# Patient Record
Sex: FEMALE | Race: OTHER | HISPANIC OR LATINO
[De-identification: names, ages, dates, MRNs, and addresses within clinical notes are randomized per-mention and may not be internally consistent; named-entity substitution may affect disease eponyms.]

---

## 2020-03-12 ENCOUNTER — TRANSCRIPTION ENCOUNTER (OUTPATIENT)
Age: 20
End: 2020-03-12

## 2020-03-27 ENCOUNTER — APPOINTMENT (OUTPATIENT)
Dept: INTERNAL MEDICINE | Facility: CLINIC | Age: 20
End: 2020-03-27
Payer: COMMERCIAL

## 2020-03-27 VITALS
OXYGEN SATURATION: 99 % | SYSTOLIC BLOOD PRESSURE: 92 MMHG | HEART RATE: 52 BPM | DIASTOLIC BLOOD PRESSURE: 60 MMHG | WEIGHT: 138 LBS | HEIGHT: 63 IN | BODY MASS INDEX: 24.45 KG/M2

## 2020-03-27 VITALS — TEMPERATURE: 98.4 F

## 2020-03-27 DIAGNOSIS — Z78.9 OTHER SPECIFIED HEALTH STATUS: ICD-10-CM

## 2020-03-27 PROCEDURE — 99203 OFFICE O/P NEW LOW 30 MIN: CPT

## 2020-04-26 ENCOUNTER — MESSAGE (OUTPATIENT)
Age: 20
End: 2020-04-26

## 2020-05-04 LAB
SARS-COV-2 IGG SERPL IA-ACNC: <0.1 INDEX
SARS-COV-2 IGG SERPL QL IA: NEGATIVE

## 2020-05-18 ENCOUNTER — APPOINTMENT (OUTPATIENT)
Dept: INTERNAL MEDICINE | Facility: CLINIC | Age: 20
End: 2020-05-18
Payer: COMMERCIAL

## 2020-05-18 ENCOUNTER — TRANSCRIPTION ENCOUNTER (OUTPATIENT)
Age: 20
End: 2020-05-18

## 2020-05-18 VITALS
HEART RATE: 56 BPM | BODY MASS INDEX: 23.57 KG/M2 | SYSTOLIC BLOOD PRESSURE: 118 MMHG | TEMPERATURE: 98.8 F | DIASTOLIC BLOOD PRESSURE: 80 MMHG | WEIGHT: 133 LBS | HEIGHT: 63 IN

## 2020-05-18 DIAGNOSIS — Z76.89 PERSONS ENCOUNTERING HEALTH SERVICES IN OTHER SPECIFIED CIRCUMSTANCES: ICD-10-CM

## 2020-05-18 PROCEDURE — 99395 PREV VISIT EST AGE 18-39: CPT | Mod: 25

## 2020-05-18 PROCEDURE — 36415 COLL VENOUS BLD VENIPUNCTURE: CPT

## 2020-05-18 NOTE — HEALTH RISK ASSESSMENT
[Very Good] : ~his/her~  mood as very good [No] : No [0] : 2) Feeling down, depressed, or hopeless: Not at all (0) [HIV test declined] : HIV test declined [With Family] : lives with family [Employed] : employed [High School] : high school [Single] : single [Sexually Active] : sexually active [Feels Safe at Home] : Feels safe at home [With Patient/Caregiver] : With Patient/Caregiver [Designated Healthcare Proxy] : Designated healthcare proxy [Name: ___] : Health Care Proxy's Name: [unfilled]  [Relationship: ___] : Relationship: [unfilled] [] : No [[id number]] : 0 [AdvancecareDate] : 05/18/20

## 2020-05-18 NOTE — PHYSICAL EXAM
[Normal] : normal gait, coordination grossly intact, no focal deficits [de-identified] : dense breasts [de-identified] : no rashes

## 2020-05-19 LAB
25(OH)D3 SERPL-MCNC: 27.9 NG/ML
ALBUMIN SERPL ELPH-MCNC: 5 G/DL
ALP BLD-CCNC: 81 U/L
ALT SERPL-CCNC: 16 U/L
ANION GAP SERPL CALC-SCNC: 13 MMOL/L
AST SERPL-CCNC: 16 U/L
BASOPHILS # BLD AUTO: 0.02 K/UL
BASOPHILS NFR BLD AUTO: 0.3 %
BILIRUB SERPL-MCNC: 0.2 MG/DL
BUN SERPL-MCNC: 9 MG/DL
CALCIUM SERPL-MCNC: 9.2 MG/DL
CHLORIDE SERPL-SCNC: 103 MMOL/L
CHOLEST SERPL-MCNC: 150 MG/DL
CHOLEST/HDLC SERPL: 2.5 RATIO
CO2 SERPL-SCNC: 25 MMOL/L
CREAT SERPL-MCNC: 0.63 MG/DL
EOSINOPHIL # BLD AUTO: 0.04 K/UL
EOSINOPHIL NFR BLD AUTO: 0.6 %
GLUCOSE SERPL-MCNC: 87 MG/DL
HBV SURFACE AB SER QL: ABNORMAL
HCT VFR BLD CALC: 41.9 %
HDLC SERPL-MCNC: 60 MG/DL
HGB BLD-MCNC: 13.4 G/DL
IMM GRANULOCYTES NFR BLD AUTO: 0.3 %
LDLC SERPL CALC-MCNC: 63 MG/DL
LYMPHOCYTES # BLD AUTO: 1.69 K/UL
LYMPHOCYTES NFR BLD AUTO: 27.4 %
MAN DIFF?: NORMAL
MCHC RBC-ENTMCNC: 28.6 PG
MCHC RBC-ENTMCNC: 32 GM/DL
MCV RBC AUTO: 89.5 FL
MONOCYTES # BLD AUTO: 0.36 K/UL
MONOCYTES NFR BLD AUTO: 5.8 %
NEUTROPHILS # BLD AUTO: 4.03 K/UL
NEUTROPHILS NFR BLD AUTO: 65.6 %
PLATELET # BLD AUTO: 266 K/UL
POTASSIUM SERPL-SCNC: 3.7 MMOL/L
PROT SERPL-MCNC: 7.3 G/DL
RBC # BLD: 4.68 M/UL
RBC # FLD: 13 %
SODIUM SERPL-SCNC: 141 MMOL/L
T4 FREE SERPL-MCNC: 1.2 NG/DL
TRIGL SERPL-MCNC: 139 MG/DL
TSH SERPL-ACNC: 1.38 UIU/ML
WBC # FLD AUTO: 6.16 K/UL

## 2020-06-03 ENCOUNTER — APPOINTMENT (OUTPATIENT)
Dept: OBGYN | Facility: CLINIC | Age: 20
End: 2020-06-03

## 2020-07-08 ENCOUNTER — APPOINTMENT (OUTPATIENT)
Dept: OBGYN | Facility: CLINIC | Age: 20
End: 2020-07-08
Payer: COMMERCIAL

## 2020-07-08 VITALS
DIASTOLIC BLOOD PRESSURE: 62 MMHG | WEIGHT: 135 LBS | HEIGHT: 63 IN | SYSTOLIC BLOOD PRESSURE: 102 MMHG | BODY MASS INDEX: 23.92 KG/M2 | TEMPERATURE: 97.8 F

## 2020-07-08 PROCEDURE — 99214 OFFICE O/P EST MOD 30 MIN: CPT

## 2020-07-08 PROCEDURE — 36415 COLL VENOUS BLD VENIPUNCTURE: CPT

## 2020-07-09 ENCOUNTER — TRANSCRIPTION ENCOUNTER (OUTPATIENT)
Age: 20
End: 2020-07-09

## 2020-07-09 LAB
BASOPHILS # BLD AUTO: 0.03 K/UL
BASOPHILS NFR BLD AUTO: 0.4 %
EOSINOPHIL # BLD AUTO: 0.03 K/UL
EOSINOPHIL NFR BLD AUTO: 0.4 %
HCT VFR BLD CALC: 38.9 %
HGB BLD-MCNC: 12.5 G/DL
IMM GRANULOCYTES NFR BLD AUTO: 0.4 %
LYMPHOCYTES # BLD AUTO: 1.74 K/UL
LYMPHOCYTES NFR BLD AUTO: 22 %
MAN DIFF?: NORMAL
MCHC RBC-ENTMCNC: 29.1 PG
MCHC RBC-ENTMCNC: 32.1 GM/DL
MCV RBC AUTO: 90.5 FL
MONOCYTES # BLD AUTO: 0.48 K/UL
MONOCYTES NFR BLD AUTO: 6.1 %
NEUTROPHILS # BLD AUTO: 5.61 K/UL
NEUTROPHILS NFR BLD AUTO: 70.7 %
PLATELET # BLD AUTO: 232 K/UL
PROLACTIN SERPL-MCNC: 17.3 NG/ML
RBC # BLD: 4.3 M/UL
RBC # FLD: 13.2 %
TSH SERPL-ACNC: 1.28 UIU/ML
WBC # FLD AUTO: 7.92 K/UL

## 2020-07-09 NOTE — PHYSICAL EXAM
[Awake] : awake [Alert] : alert [Acute Distress] : no acute distress [LAD] : no lymphadenopathy [Thyroid Nodule] : no thyroid nodule [Goiter] : no goiter [Soft] : soft [Tender] : non tender [Distended] : not distended [H/Smegaly] : no hepatosplenomegaly [Oriented x3] : oriented to person, place, and time [Depressed Mood] : not depressed [Flat Affect] : affect not flat [RRR, No Murmurs] : RRR, no murmurs [CTAB] : CTAB

## 2020-07-09 NOTE — DISCUSSION/SUMMARY
[FreeTextEntry1] : Irregular menses - new onset over past 2 years\par Acne worsening over past year\par \par Reviewed possible causes and w/up\par Also desires more reliable BCM given on spironolactone\par reviewed types including combined oral contraceptive pills, IUD\par decided on combined oral contraceptive pills - reviewed pros/cons and risks including blood clot risk to extremities, lung and stroke (more remote)\par \par plan is for labwork\par PUS\par \par Will call with results when all have come in\par Answered questions\par \par Orin Perez MD, PhD\par

## 2020-07-09 NOTE — REVIEW OF SYSTEMS
[Chills] : no chills [Feeling Tired] : not feeling tired [Chest Pain] : no chest pain [Palpitations] : no palpitations [Dyspnea] : no shortness of breath [Cough] : no cough [SOB on Exertion] : no shortness of breath during exertion [Abdominal Pain] : no abdominal pain [Constipation] : no constipation [Diarrhea] : no diarrhea [Dysuria] : no dysuria [Pelvic Pain] : no pelvic pain [Abn Vag Bleeding] : abnormal vaginal bleeding [Urethral Discharge] : no abnormal urethral discharge [Arthralgias] : no arthralgias [Joint Pain] : no joint pain [Skin Lesions] : no skin lesions [Breast Pain] : no breast pain [Breast Lump] : no breast lump [Dizziness] : no dizziness [Fainting] : no fainting [Headache] : no headache [Sleep Disturbances] : no sleep disturbances [Anxiety] : no anxiety [Depression] : no depression [Hot Flashes] : no hot flashes [Deepening Voice] : no deepening of the voice [Feeling Weak] : no feelings of weakness [Nl] : Musculoskeletal

## 2020-07-10 ENCOUNTER — ASOB RESULT (OUTPATIENT)
Age: 20
End: 2020-07-10

## 2020-07-10 ENCOUNTER — APPOINTMENT (OUTPATIENT)
Dept: OBGYN | Facility: CLINIC | Age: 20
End: 2020-07-10
Payer: COMMERCIAL

## 2020-07-10 PROCEDURE — 76830 TRANSVAGINAL US NON-OB: CPT

## 2020-07-14 LAB
17OHP SERPL-MCNC: 37 NG/DL
TESTOST BND SERPL-MCNC: 0.9 PG/ML
TESTOST SERPL-MCNC: 23.5 NG/DL

## 2020-10-14 ENCOUNTER — APPOINTMENT (OUTPATIENT)
Dept: OBGYN | Facility: CLINIC | Age: 20
End: 2020-10-14
Payer: COMMERCIAL

## 2020-10-14 VITALS
BODY MASS INDEX: 23.74 KG/M2 | HEIGHT: 63 IN | SYSTOLIC BLOOD PRESSURE: 106 MMHG | TEMPERATURE: 97.8 F | WEIGHT: 134 LBS | DIASTOLIC BLOOD PRESSURE: 62 MMHG

## 2020-10-14 DIAGNOSIS — Z84.2 FAMILY HISTORY OF OTHER DISEASES OF THE GENITOURINARY SYSTEM: ICD-10-CM

## 2020-10-14 PROCEDURE — 99213 OFFICE O/P EST LOW 20 MIN: CPT

## 2020-10-14 RX ORDER — SPIRONOLACTONE 50 MG/1
50 TABLET ORAL
Refills: 0 | Status: DISCONTINUED | COMMUNITY
End: 2020-10-14

## 2020-10-15 ENCOUNTER — TRANSCRIPTION ENCOUNTER (OUTPATIENT)
Age: 20
End: 2020-10-15

## 2020-10-15 PROBLEM — Z84.2 FAMILY HISTORY OF OVARIAN CYST: Status: ACTIVE | Noted: 2020-10-15

## 2020-10-15 LAB
C TRACH RRNA SPEC QL NAA+PROBE: NOT DETECTED
N GONORRHOEA RRNA SPEC QL NAA+PROBE: NOT DETECTED
SOURCE AMPLIFICATION: NORMAL

## 2020-10-15 NOTE — HISTORY OF PRESENT ILLNESS
[FreeTextEntry1] : Ms Marquez is a healthy 18 yo G0 who presents for f/up of\par Irregular menses, acne and likely PCOS\par \par no changes in interim health\par \par Since last visit  - stopped spironolactone and started doxycycline by dermatologist\par some slow improvement with acne overall\par \par Started on trisprintec at our last visit 3 mo ago\par No unscheduled bleeding\par less cramping\par no side effects

## 2020-10-15 NOTE — PHYSICAL EXAM
[Appropriately responsive] : appropriately responsive [Alert] : alert [No Acute Distress] : no acute distress [No Lymphadenopathy] : no lymphadenopathy [Oriented x3] : oriented x3 [FreeTextEntry2] : skin - some improvement in acne on face - less at chin-line

## 2020-10-15 NOTE — REVIEW OF SYSTEMS
[Fever] : no fever [Fatigue] : no fatigue [Night Sweats] : no night sweats [Dry Eyes] : no dry eyes [Dyspnea] : no dyspnea [Chest Pain] : no chest pain [Cough] : no cough [Palpitations] : no palpitations [Abdominal Pain] : no abdominal pain [Constipation] : no constipation [Urgency] : no urgency [Frequency] : no frequency [Incontinence] : no incontinence [Dysuria] : no dysuria [Urethral Discharge] : no urethral discharge [Abn Vaginal bleeding] : no abnormal vaginal bleeding [Pelvic pain] : no pelvic pain [CVA Pain] : no CVA pain [Genital Rash/Irritation] : no genital rash/irritation [Breast Pain] : no breast pain [Breast Lump] : no breast lump [Nipple Changes] : no nipple changes [Skin Lesion on Breast] : no skin lesion on breast [Arthralgias] : no arthralgias [Myalgias] : no myalgias [Joint Stiffness] : no joint stiffness [Joint Swelling] : no joint swelling [Back Pain] : no back pain [Headache] : no headache [Dizziness] : no dizziness [Fainting] : no fainting [Anxiety] : no anxiety [Convulsions] : no convulsions [Depression] : no depression [Sleep Disturbances] : no sleep disturbances [Decreased Libido] : libido not decreased [PMS/PMDD Symptoms] : no PMS/PMDD symptoms [Feeling Weak] : not feeling weak [Deepening Voice] : no deepening voice [Hot Flashes] : no hot flashes [Easy Bleeding] : no easy bleeding [Easy Bruising] : no easy bruising [Swollen Glands] : no swollen glands [Nose Bleeds] : no nose bleeds [Negative] : Heme/Lymph [FreeTextEntry8] : post-coital spotting [de-identified] : slow improvement with acne

## 2020-10-15 NOTE — PLAN
[FreeTextEntry1] : PCOS\par will continue on tri-phasic pill\par bleeding regulated and decreased cramps no side effects\par reviewed strategies for skin\par \par new onset postcoital bleeding \par will check gc/chl\par steady partner - not new relationship\par \par answered questions\par \par f/up annual or sooner if issues\par \par Orin Perez MD, PhD\par

## 2020-10-16 ENCOUNTER — TRANSCRIPTION ENCOUNTER (OUTPATIENT)
Age: 20
End: 2020-10-16

## 2021-04-14 ENCOUNTER — APPOINTMENT (OUTPATIENT)
Dept: OBGYN | Facility: CLINIC | Age: 21
End: 2021-04-14
Payer: COMMERCIAL

## 2021-04-14 VITALS
SYSTOLIC BLOOD PRESSURE: 106 MMHG | HEIGHT: 63 IN | BODY MASS INDEX: 24.45 KG/M2 | DIASTOLIC BLOOD PRESSURE: 70 MMHG | WEIGHT: 138 LBS

## 2021-04-14 DIAGNOSIS — Z12.4 ENCOUNTER FOR SCREENING FOR MALIGNANT NEOPLASM OF CERVIX: ICD-10-CM

## 2021-04-14 PROCEDURE — 99395 PREV VISIT EST AGE 18-39: CPT

## 2021-04-14 PROCEDURE — 99072 ADDL SUPL MATRL&STAF TM PHE: CPT

## 2021-04-14 NOTE — REVIEW OF SYSTEMS
[Negative] : Heme/Lymph [Fever] : no fever [Fatigue] : no fatigue [Night Sweats] : no night sweats [Dry Eyes] : no dry eyes [Dyspnea] : no dyspnea [Cough] : no cough [Chest Pain] : no chest pain [Palpitations] : no palpitations [Abdominal Pain] : no abdominal pain [Constipation] : no constipation [Urgency] : no urgency [Frequency] : no frequency [Incontinence] : no incontinence [Dysuria] : no dysuria [Urethral Discharge] : no urethral discharge [Abn Vaginal bleeding] : no abnormal vaginal bleeding [Pelvic pain] : no pelvic pain [CVA Pain] : no CVA pain [Genital Rash/Irritation] : no genital rash/irritation [Breast Pain] : no breast pain [Breast Lump] : no breast lump [Nipple Changes] : no nipple changes [Skin Lesion on Breast] : no skin lesion on breast [Arthralgias] : no arthralgias [Myalgias] : no myalgias [Joint Stiffness] : no joint stiffness [Joint Swelling] : no joint swelling [Back Pain] : no back pain [Headache] : no headache [Dizziness] : no dizziness [Fainting] : no fainting [Convulsions] : no convulsions [Anxiety] : no anxiety [Depression] : no depression [Sleep Disturbances] : no sleep disturbances [Decreased Libido] : libido not decreased [PMS/PMDD Symptoms] : no PMS/PMDD symptoms [Deepening Voice] : no deepening voice [Feeling Weak] : not feeling weak [Hot Flashes] : no hot flashes [Easy Bleeding] : no easy bleeding [Easy Bruising] : no easy bruising [Swollen Glands] : no swollen glands [Nose Bleeds] : no nose bleeds [de-identified] : slow improvement with acne

## 2021-04-14 NOTE — HISTORY OF PRESENT ILLNESS
[Oral Contraceptive] : uses oral contraception pills [Y] : Patient is sexually active [Monogamous (Male Partner)] : is monogamous with a male partner [TextBox_4] : Aleksandra is a 19 yo G0 who presents for annual gyn exam\par \par Reviewed interim medical, surgical and family history\par \par Has been followed for PCOS and irregular menses\par on a triphasic pill \par menses regular with no issues\par \par Postcoital spotting resolved [LMPDate] : 04/04/21 [MensesFreq] : monthly [MensesLength] : 5 [PGHxTotal] : 0

## 2021-04-14 NOTE — PLAN
[FreeTextEntry1] : Annual exam\par pap \par normal breast and pelvic exam\par \par PCOS\par \par Using oral contraceptive pills to regulate menses\par acne slowly improving \par will continue \par \par answered all questions\par \par Orin Perez MD, PhD\par

## 2021-04-14 NOTE — PHYSICAL EXAM
[Appropriately responsive] : appropriately responsive [Alert] : alert [No Acute Distress] : no acute distress [No Lymphadenopathy] : no lymphadenopathy [Soft] : soft [Non-tender] : non-tender [Non-distended] : non-distended [No HSM] : No HSM [No Mass] : no mass [Oriented x3] : oriented x3 [Examination Of The Breasts] : a normal appearance [No Discharge] : no discharge [No Masses] : no breast masses were palpable [No Lesions] : no lesions  [Labia Majora] : normal [Labia Minora] : normal [Pink Rugae] : pink rugae [No Bleeding] : There was no active vaginal bleeding [Normal] : normal [Normal Position] : in a normal position [Uterine Adnexae] : normal [FreeTextEntry3] : no enlargement or nodules [FreeTextEntry6] : Examined sitting and recumbent [Tenderness] : nontender [Enlarged ___ wks] : not enlarged [Mass ___ cm] : no uterine mass was palpated [FreeTextEntry5] : no masses/polyps/cmt [FreeTextEntry9] : deferred [FreeTextEntry8] : no pelvic masses

## 2021-04-19 ENCOUNTER — TRANSCRIPTION ENCOUNTER (OUTPATIENT)
Age: 21
End: 2021-04-19

## 2021-04-19 LAB — CYTOLOGY CVX/VAG DOC THIN PREP: NORMAL

## 2021-05-20 ENCOUNTER — RX RENEWAL (OUTPATIENT)
Age: 21
End: 2021-05-20

## 2021-07-01 ENCOUNTER — APPOINTMENT (OUTPATIENT)
Dept: INTERNAL MEDICINE | Facility: CLINIC | Age: 21
End: 2021-07-01
Payer: COMMERCIAL

## 2021-07-01 VITALS
HEART RATE: 60 BPM | DIASTOLIC BLOOD PRESSURE: 60 MMHG | TEMPERATURE: 99.3 F | OXYGEN SATURATION: 99 % | HEIGHT: 63 IN | WEIGHT: 136 LBS | SYSTOLIC BLOOD PRESSURE: 106 MMHG

## 2021-07-01 DIAGNOSIS — L65.9 NONSCARRING HAIR LOSS, UNSPECIFIED: ICD-10-CM

## 2021-07-01 DIAGNOSIS — N93.0 POSTCOITAL AND CONTACT BLEEDING: ICD-10-CM

## 2021-07-01 DIAGNOSIS — Z00.01 ENCOUNTER FOR GENERAL ADULT MEDICAL EXAMINATION WITH ABNORMAL FINDINGS: ICD-10-CM

## 2021-07-01 PROCEDURE — 99072 ADDL SUPL MATRL&STAF TM PHE: CPT

## 2021-07-01 PROCEDURE — 99395 PREV VISIT EST AGE 18-39: CPT | Mod: 25

## 2021-07-01 PROCEDURE — 36415 COLL VENOUS BLD VENIPUNCTURE: CPT

## 2021-07-01 NOTE — HEALTH RISK ASSESSMENT
[Very Good] : ~his/her~  mood as very good [Yes] : Yes [0] : 2) Feeling down, depressed, or hopeless: Not at all (0) [Patient reported PAP Smear was normal] : Patient reported PAP Smear was normal [HIV test declined] : HIV test declined [With Family] : lives with family [Employed] : employed [College] : College [Single] : single [# Of Children ___] : has [unfilled] children [] : No [[id number]] : 0 [PapSmearDate] : 01/21

## 2021-07-01 NOTE — PAST MEDICAL HISTORY
[Menstruating] : menstruating [Regular Cycle Intervals] : have been regular [FreeTextEntry1] : on OCT

## 2021-07-01 NOTE — PHYSICAL EXAM
[Normal] : normal gait, coordination grossly intact, no focal deficits [de-identified] : dense breasts [de-identified] : no rashes, tattoo

## 2021-11-11 ENCOUNTER — TRANSCRIPTION ENCOUNTER (OUTPATIENT)
Age: 21
End: 2021-11-11

## 2022-02-28 ENCOUNTER — RESULT REVIEW (OUTPATIENT)
Age: 22
End: 2022-02-28

## 2022-08-08 ENCOUNTER — NON-APPOINTMENT (OUTPATIENT)
Age: 22
End: 2022-08-08

## 2022-08-08 ENCOUNTER — APPOINTMENT (OUTPATIENT)
Dept: OBGYN | Facility: CLINIC | Age: 22
End: 2022-08-08

## 2022-08-08 VITALS
WEIGHT: 146 LBS | HEIGHT: 63 IN | BODY MASS INDEX: 25.87 KG/M2 | SYSTOLIC BLOOD PRESSURE: 102 MMHG | DIASTOLIC BLOOD PRESSURE: 60 MMHG

## 2022-08-08 DIAGNOSIS — Z01.419 ENCOUNTER FOR GYNECOLOGICAL EXAMINATION (GENERAL) (ROUTINE) W/OUT ABNORMAL FINDINGS: ICD-10-CM

## 2022-08-08 DIAGNOSIS — Z12.39 ENCOUNTER FOR OTHER SCREENING FOR MALIGNANT NEOPLASM OF BREAST: ICD-10-CM

## 2022-08-08 DIAGNOSIS — Z30.41 ENCOUNTER FOR SURVEILLANCE OF CONTRACEPTIVE PILLS: ICD-10-CM

## 2022-08-08 DIAGNOSIS — N92.6 IRREGULAR MENSTRUATION, UNSPECIFIED: ICD-10-CM

## 2022-08-08 PROCEDURE — 99395 PREV VISIT EST AGE 18-39: CPT

## 2022-08-08 PROCEDURE — 36415 COLL VENOUS BLD VENIPUNCTURE: CPT

## 2022-08-09 ENCOUNTER — TRANSCRIPTION ENCOUNTER (OUTPATIENT)
Age: 22
End: 2022-08-09

## 2022-08-09 LAB — TSH SERPL-ACNC: 1.52 UIU/ML

## 2022-08-09 NOTE — REVIEW OF SYSTEMS
[Negative] : Heme/Lymph [Fever] : no fever [Fatigue] : no fatigue [Night Sweats] : no night sweats [Dry Eyes] : no dry eyes [Dyspnea] : no dyspnea [Cough] : no cough [Chest Pain] : no chest pain [Palpitations] : no palpitations [Abdominal Pain] : no abdominal pain [Constipation] : no constipation [Urgency] : no urgency [Frequency] : no frequency [Incontinence] : no incontinence [Dysuria] : no dysuria [Urethral Discharge] : no urethral discharge [Abn Vaginal bleeding] : abnormal vaginal bleeding [Pelvic pain] : no pelvic pain [CVA Pain] : no CVA pain [Genital Rash/Irritation] : no genital rash/irritation [Breast Pain] : no breast pain [Breast Lump] : no breast lump [Nipple Changes] : no nipple changes [Skin Lesion on Breast] : no skin lesion on breast [Arthralgias] : no arthralgias [Myalgias] : no myalgias [Joint Stiffness] : no joint stiffness [Joint Swelling] : no joint swelling [Back Pain] : no back pain [Headache] : no headache [Dizziness] : no dizziness [Fainting] : no fainting [Convulsions] : no convulsions [Anxiety] : no anxiety [Depression] : no depression [Sleep Disturbances] : no sleep disturbances [Decreased Libido] : libido not decreased [PMS/PMDD Symptoms] : no PMS/PMDD symptoms [Deepening Voice] : no deepening voice [Feeling Weak] : not feeling weak [Hot Flashes] : no hot flashes [Easy Bleeding] : no easy bleeding [Easy Bruising] : no easy bruising [Swollen Glands] : no swollen glands [Nose Bleeds] : no nose bleeds [de-identified] : acne improved

## 2022-08-09 NOTE — PHYSICAL EXAM
[Appropriately responsive] : appropriately responsive [Alert] : alert [No Acute Distress] : no acute distress [No Lymphadenopathy] : no lymphadenopathy [Soft] : soft [Non-tender] : non-tender [Non-distended] : non-distended [No HSM] : No HSM [No Mass] : no mass [Oriented x3] : oriented x3 [Examination Of The Breasts] : a normal appearance [No Discharge] : no discharge [No Masses] : no breast masses were palpable [No Lesions] : no lesions  [Labia Majora] : normal [Labia Minora] : normal [Pink Rugae] : pink rugae [Normal] : normal [Normal Position] : in a normal position [Uterine Adnexae] : normal [Chaperone Present] : A chaperone was present in the examining room during all aspects of the physical examination [FreeTextEntry1] : MORENA Tovar [FreeTextEntry3] : no enlargement or nodules [FreeTextEntry6] : Examined sitting and recumbent [Scant] : There was scant vaginal bleeding [Tenderness] : nontender [Enlarged ___ wks] : not enlarged [Mass ___ cm] : no uterine mass was palpated [FreeTextEntry4] : old blood in vault [FreeTextEntry5] : no masses/polyps/cmt [FreeTextEntry9] : deferred [FreeTextEntry8] : no pelvic masses

## 2022-08-09 NOTE — PLAN
[FreeTextEntry1] : Annual gynecological care\par Reviewed cervical cancer screening guidelines/recommendations from ACOG/ASCCP - up to date\par Breast cancer screening - nml exam  - mammograms to start at 41 yo\par Colon cancer screening - start at 46 yo\par \par AUB on BC\par will check TSH\par will check PUS\par if normal - consider changing pill\par \par Answered all questions\par Orin Perez MD, PhD\par

## 2022-08-09 NOTE — HISTORY OF PRESENT ILLNESS
[Patient reported PAP Smear was normal] : Patient reported PAP Smear was normal [Oral Contraceptive] : uses oral contraception pills [Y] : Patient is sexually active [Monogamous (Male Partner)] : is monogamous with a male partner [TextBox_4] : Aleksandra is a 20 yo G0 who presents for annual gyn exam\par \par Reviewed interim medical, surgical and family history\par \par Has been followed for PCOS and irregular menses\par on a triphasic pill \par menses had been regular with no issues until past few months\par Patient has been having BTB that can be as heavy as a menses on different times during the pill pack - 3 - 4 days at a time\par no new sexual partners\par no other sx\par  [PapSmeardate] : 2021 [TextBox_102] : see above [LMPDate] : 04/04/21 [MensesFreq] : monthly [MensesLength] : 5 [PGHxTotal] : 0

## 2022-08-10 ENCOUNTER — TRANSCRIPTION ENCOUNTER (OUTPATIENT)
Age: 22
End: 2022-08-10

## 2022-08-10 ENCOUNTER — APPOINTMENT (OUTPATIENT)
Dept: OBGYN | Facility: CLINIC | Age: 22
End: 2022-08-10

## 2022-08-11 ENCOUNTER — ASOB RESULT (OUTPATIENT)
Age: 22
End: 2022-08-11

## 2022-08-11 ENCOUNTER — APPOINTMENT (OUTPATIENT)
Dept: OBGYN | Facility: CLINIC | Age: 22
End: 2022-08-11

## 2022-08-11 PROCEDURE — 76830 TRANSVAGINAL US NON-OB: CPT

## 2022-08-12 ENCOUNTER — TRANSCRIPTION ENCOUNTER (OUTPATIENT)
Age: 22
End: 2022-08-12

## 2023-02-26 ENCOUNTER — NON-APPOINTMENT (OUTPATIENT)
Age: 23
End: 2023-02-26

## 2023-03-14 ENCOUNTER — NON-APPOINTMENT (OUTPATIENT)
Age: 23
End: 2023-03-14

## 2023-07-31 ENCOUNTER — APPOINTMENT (OUTPATIENT)
Dept: INTERNAL MEDICINE | Facility: CLINIC | Age: 23
End: 2023-07-31
Payer: COMMERCIAL

## 2023-07-31 VITALS
HEART RATE: 72 BPM | HEIGHT: 63 IN | WEIGHT: 150 LBS | SYSTOLIC BLOOD PRESSURE: 102 MMHG | BODY MASS INDEX: 26.58 KG/M2 | TEMPERATURE: 98.2 F | DIASTOLIC BLOOD PRESSURE: 60 MMHG | OXYGEN SATURATION: 98 %

## 2023-07-31 DIAGNOSIS — Z00.00 ENCOUNTER FOR GENERAL ADULT MEDICAL EXAMINATION W/OUT ABNORMAL FINDINGS: ICD-10-CM

## 2023-07-31 DIAGNOSIS — E28.2 POLYCYSTIC OVARIAN SYNDROME: ICD-10-CM

## 2023-07-31 PROCEDURE — 99395 PREV VISIT EST AGE 18-39: CPT

## 2023-07-31 NOTE — HEALTH RISK ASSESSMENT
[Very Good] : ~his/her~  mood as very good [Yes] : Yes [0] : 2) Feeling down, depressed, or hopeless: Not at all (0) [PHQ-2 Negative - No further assessment needed] : PHQ-2 Negative - No further assessment needed [Patient reported PAP Smear was normal] : Patient reported PAP Smear was normal [HIV test declined] : HIV test declined [Employed] : employed [College] : College [Single] : single [Fully functional (bathing, dressing, toileting, transferring, walking, feeding)] : Fully functional (bathing, dressing, toileting, transferring, walking, feeding) [Fully functional (using the telephone, shopping, preparing meals, housekeeping, doing laundry, using] : Fully functional and needs no help or supervision to perform IADLs (using the telephone, shopping, preparing meals, housekeeping, doing laundry, using transportation, managing medications and managing finances) [Patient/Caregiver not ready to engage] : , patient/caregiver not ready to engage [Never] : Never [[id number]] : 0 [Reports changes in hearing] : Reports no changes in hearing [Reports changes in vision] : Reports no changes in vision [PapSmearDate] : 01/22

## 2023-07-31 NOTE — PHYSICAL EXAM
[Normal] : normal gait, coordination grossly intact, no focal deficits [de-identified] : dense breasts no gross lumps [de-identified] : no rashes

## 2023-08-27 ENCOUNTER — NON-APPOINTMENT (OUTPATIENT)
Age: 23
End: 2023-08-27

## 2023-09-11 ENCOUNTER — RX RENEWAL (OUTPATIENT)
Age: 23
End: 2023-09-11

## 2023-09-15 RX ORDER — NORGESTIMATE AND ETHINYL ESTRADIOL 7DAYSX3 28
0.18/0.215/0.25 KIT ORAL
Qty: 84 | Refills: 2 | Status: ACTIVE | COMMUNITY
Start: 2020-07-08

## 2024-07-24 ENCOUNTER — RX RENEWAL (OUTPATIENT)
Age: 24
End: 2024-07-24

## 2024-08-05 ENCOUNTER — RX RENEWAL (OUTPATIENT)
Age: 24
End: 2024-08-05

## 2024-09-26 ENCOUNTER — NON-APPOINTMENT (OUTPATIENT)
Age: 24
End: 2024-09-26

## 2024-09-26 ENCOUNTER — APPOINTMENT (OUTPATIENT)
Dept: OBGYN | Facility: CLINIC | Age: 24
End: 2024-09-26
Payer: COMMERCIAL

## 2024-09-26 VITALS
HEIGHT: 63 IN | WEIGHT: 155 LBS | SYSTOLIC BLOOD PRESSURE: 118 MMHG | DIASTOLIC BLOOD PRESSURE: 60 MMHG | BODY MASS INDEX: 27.46 KG/M2

## 2024-09-26 DIAGNOSIS — Z12.4 ENCOUNTER FOR SCREENING FOR MALIGNANT NEOPLASM OF CERVIX: ICD-10-CM

## 2024-09-26 DIAGNOSIS — L65.9 NONSCARRING HAIR LOSS, UNSPECIFIED: ICD-10-CM

## 2024-09-26 DIAGNOSIS — N93.0 POSTCOITAL AND CONTACT BLEEDING: ICD-10-CM

## 2024-09-26 DIAGNOSIS — Z30.41 ENCOUNTER FOR SURVEILLANCE OF CONTRACEPTIVE PILLS: ICD-10-CM

## 2024-09-26 DIAGNOSIS — Z80.41 FAMILY HISTORY OF MALIGNANT NEOPLASM OF OVARY: ICD-10-CM

## 2024-09-26 DIAGNOSIS — Z11.51 ENCOUNTER FOR SCREENING FOR HUMAN PAPILLOMAVIRUS (HPV): ICD-10-CM

## 2024-09-26 DIAGNOSIS — Z01.419 ENCOUNTER FOR GYNECOLOGICAL EXAMINATION (GENERAL) (ROUTINE) W/OUT ABNORMAL FINDINGS: ICD-10-CM

## 2024-09-26 DIAGNOSIS — Z76.89 PERSONS ENCOUNTERING HEALTH SERVICES IN OTHER SPECIFIED CIRCUMSTANCES: ICD-10-CM

## 2024-09-26 PROCEDURE — 99459 PELVIC EXAMINATION: CPT

## 2024-09-26 PROCEDURE — 99395 PREV VISIT EST AGE 18-39: CPT

## 2024-09-26 NOTE — PHYSICAL EXAM
[Chaperone Present] : A chaperone was present in the examining room during all aspects of the physical examination [[id number]] : A chaperone was present during the pelvic exam. [Appropriately responsive] : appropriately responsive [Alert] : alert [No Acute Distress] : no acute distress [Soft] : soft [Non-tender] : non-tender [Non-distended] : non-distended [No HSM] : No HSM [No Mass] : no mass [Oriented x3] : oriented x3 [Examination Of The Breasts] : a normal appearance [No Discharge] : no discharge [No Masses] : no breast masses were palpable [No Lesions] : no lesions  [Labia Majora] : normal [Labia Minora] : normal [Pink Rugae] : pink rugae [Normal] : normal [Uterine Adnexae] : normal [FreeTextEntry2] : Lara [Tenderness] : nontender [Enlarged ___ wks] : not enlarged [FreeTextEntry5] : No CMT

## 2024-09-26 NOTE — HISTORY OF PRESENT ILLNESS
[FreeTextEntry1] : 24 y/o G0 presents for annual GYN exam.  In a long-term relationship of seven years. On OCPs and happy with current method and pill.   Regular monthly periods without any intermenstrual spotting.  Declines STD testing.  Overall good health.  No GYN complaints.  Pap 4/14/21- NILM.  HPV vaccine as a young teen.  Maternal GM ovarian ca.  Denies FH of ca of breast, uterus or colon.  Lives with patients.  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

## 2024-09-26 NOTE — HISTORY OF PRESENT ILLNESS
[FreeTextEntry1] : 22 y/o G0 presents for annual GYN exam.  In a long-term relationship of seven years. On OCPs and happy with current method and pill.   Regular monthly periods without any intermenstrual spotting.  Declines STD testing.  Overall good health.  No GYN complaints.  Pap 4/14/21- NILM.  HPV vaccine as a young teen.  Maternal GM ovarian ca.  Denies FH of ca of breast, uterus or colon.  Lives with patients.  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

## 2024-09-26 NOTE — PHYSICAL EXAM
[Chaperone Present] : A chaperone was present in the examining room during all aspects of the physical examination [[id number]] : A chaperone was present during the pelvic exam. [Appropriately responsive] : appropriately responsive [Alert] : alert [No Acute Distress] : no acute distress [Soft] : soft [Non-tender] : non-tender [Non-distended] : non-distended [No HSM] : No HSM [No Mass] : no mass [Oriented x3] : oriented x3 [Examination Of The Breasts] : a normal appearance [No Discharge] : no discharge [No Masses] : no breast masses were palpable [No Lesions] : no lesions  [Labia Majora] : normal [Labia Minora] : normal [Pink Rugae] : pink rugae [Normal] : normal [Uterine Adnexae] : normal [FreeTextEntry2] : Lara [Tenderness] : nontender [Enlarged ___ wks] : not enlarged [FreeTextEntry5] : No CMT

## 2024-09-30 LAB
HPV HIGH+LOW RISK DNA PNL CVX: NOT DETECTED
HPV HIGH+LOW RISK DNA PNL CVX: NOT DETECTED

## 2024-10-03 LAB — CYTOLOGY CVX/VAG DOC THIN PREP: NORMAL

## 2024-10-07 ENCOUNTER — TRANSCRIPTION ENCOUNTER (OUTPATIENT)
Age: 24
End: 2024-10-07

## 2024-10-17 ENCOUNTER — RX RENEWAL (OUTPATIENT)
Age: 24
End: 2024-10-17

## 2025-05-08 ENCOUNTER — NON-APPOINTMENT (OUTPATIENT)
Age: 25
End: 2025-05-08

## 2025-09-05 ENCOUNTER — APPOINTMENT (OUTPATIENT)
Dept: INTERNAL MEDICINE | Facility: CLINIC | Age: 25
End: 2025-09-05

## 2025-09-06 ENCOUNTER — NON-APPOINTMENT (OUTPATIENT)
Age: 25
End: 2025-09-06

## 2025-09-08 ENCOUNTER — APPOINTMENT (OUTPATIENT)
Dept: FAMILY MEDICINE | Facility: CLINIC | Age: 25
End: 2025-09-08
Payer: COMMERCIAL

## 2025-09-08 VITALS
TEMPERATURE: 98.6 F | BODY MASS INDEX: 27.82 KG/M2 | SYSTOLIC BLOOD PRESSURE: 110 MMHG | OXYGEN SATURATION: 99 % | WEIGHT: 157 LBS | HEIGHT: 63 IN | DIASTOLIC BLOOD PRESSURE: 62 MMHG | HEART RATE: 76 BPM

## 2025-09-08 DIAGNOSIS — Z00.01 ENCOUNTER FOR GENERAL ADULT MEDICAL EXAMINATION WITH ABNORMAL FINDINGS: ICD-10-CM

## 2025-09-08 DIAGNOSIS — Z13.31 ENCOUNTER FOR SCREENING FOR DEPRESSION: ICD-10-CM

## 2025-09-08 DIAGNOSIS — E28.2 POLYCYSTIC OVARIAN SYNDROME: ICD-10-CM

## 2025-09-08 DIAGNOSIS — Z13.1 ENCOUNTER FOR SCREENING FOR DIABETES MELLITUS: ICD-10-CM

## 2025-09-08 DIAGNOSIS — Z13.220 ENCOUNTER FOR SCREENING FOR LIPOID DISORDERS: ICD-10-CM

## 2025-09-08 DIAGNOSIS — Z23 ENCOUNTER FOR IMMUNIZATION: ICD-10-CM

## 2025-09-08 DIAGNOSIS — Z87.42 PERSONAL HISTORY OF OTHER DISEASES OF THE FEMALE GENITAL TRACT: ICD-10-CM

## 2025-09-08 DIAGNOSIS — Z00.00 ENCOUNTER FOR GENERAL ADULT MEDICAL EXAMINATION W/OUT ABNORMAL FINDINGS: ICD-10-CM

## 2025-09-08 PROCEDURE — 99395 PREV VISIT EST AGE 18-39: CPT | Mod: 25

## 2025-09-08 PROCEDURE — 90715 TDAP VACCINE 7 YRS/> IM: CPT

## 2025-09-08 PROCEDURE — G0444 DEPRESSION SCREEN ANNUAL: CPT | Mod: 59

## 2025-09-08 PROCEDURE — 90471 IMMUNIZATION ADMIN: CPT

## 2025-09-09 ENCOUNTER — TRANSCRIPTION ENCOUNTER (OUTPATIENT)
Age: 25
End: 2025-09-09

## 2025-09-09 LAB
ALBUMIN SERPL ELPH-MCNC: 4.5 G/DL
ALP BLD-CCNC: 92 U/L
ALT SERPL-CCNC: 39 U/L
ANION GAP SERPL CALC-SCNC: 14 MMOL/L
AST SERPL-CCNC: 37 U/L
BILIRUB SERPL-MCNC: 0.3 MG/DL
BUN SERPL-MCNC: 12 MG/DL
CALCIUM SERPL-MCNC: 9.2 MG/DL
CHLORIDE SERPL-SCNC: 103 MMOL/L
CHOLEST SERPL-MCNC: 176 MG/DL
CO2 SERPL-SCNC: 22 MMOL/L
CREAT SERPL-MCNC: 0.65 MG/DL
EGFRCR SERPLBLD CKD-EPI 2021: 125 ML/MIN/1.73M2
ESTIMATED AVERAGE GLUCOSE: 111 MG/DL
GLUCOSE SERPL-MCNC: 84 MG/DL
HBA1C MFR BLD HPLC: 5.5 %
HCT VFR BLD CALC: 38.7 %
HDLC SERPL-MCNC: 64 MG/DL
HGB BLD-MCNC: 12.3 G/DL
LDLC SERPL-MCNC: 82 MG/DL
MCHC RBC-ENTMCNC: 29 PG
MCHC RBC-ENTMCNC: 31.8 G/DL
MCV RBC AUTO: 91.3 FL
NONHDLC SERPL-MCNC: 112 MG/DL
PLATELET # BLD AUTO: 254 K/UL
POTASSIUM SERPL-SCNC: 3.9 MMOL/L
PROT SERPL-MCNC: 7.2 G/DL
RBC # BLD: 4.24 M/UL
RBC # FLD: 14 %
SODIUM SERPL-SCNC: 140 MMOL/L
TRIGL SERPL-MCNC: 180 MG/DL
TSH SERPL-ACNC: 0.85 UIU/ML
WBC # FLD AUTO: 7.19 K/UL